# Patient Record
Sex: FEMALE | Race: WHITE | NOT HISPANIC OR LATINO
[De-identification: names, ages, dates, MRNs, and addresses within clinical notes are randomized per-mention and may not be internally consistent; named-entity substitution may affect disease eponyms.]

---

## 2017-07-13 ENCOUNTER — RESULT REVIEW (OUTPATIENT)
Age: 52
End: 2017-07-13

## 2018-01-30 ENCOUNTER — EMERGENCY (EMERGENCY)
Facility: HOSPITAL | Age: 53
LOS: 1 days | Discharge: HOME | End: 2018-01-30
Attending: EMERGENCY MEDICINE | Admitting: EMERGENCY MEDICINE
Payer: COMMERCIAL

## 2018-01-30 VITALS
HEART RATE: 74 BPM | TEMPERATURE: 98 F | SYSTOLIC BLOOD PRESSURE: 161 MMHG | DIASTOLIC BLOOD PRESSURE: 86 MMHG | OXYGEN SATURATION: 97 % | RESPIRATION RATE: 16 BRPM

## 2018-01-30 DIAGNOSIS — Z95.2 PRESENCE OF PROSTHETIC HEART VALVE: Chronic | ICD-10-CM

## 2018-01-30 DIAGNOSIS — R04.0 EPISTAXIS: ICD-10-CM

## 2018-01-30 DIAGNOSIS — Z98.890 OTHER SPECIFIED POSTPROCEDURAL STATES: Chronic | ICD-10-CM

## 2018-01-30 LAB
APTT BLD: 43.7 SEC — HIGH (ref 27.5–37.4)
BASOPHILS # BLD AUTO: 0 K/UL — SIGNIFICANT CHANGE UP (ref 0–0.2)
BASOPHILS NFR BLD AUTO: 0.8 % — SIGNIFICANT CHANGE UP (ref 0–2)
EOSINOPHIL # BLD AUTO: 0.1 K/UL — SIGNIFICANT CHANGE UP (ref 0–0.5)
EOSINOPHIL NFR BLD AUTO: 1.7 % — SIGNIFICANT CHANGE UP (ref 0–6)
HCT VFR BLD CALC: 39.2 % — SIGNIFICANT CHANGE UP (ref 34.5–45)
HGB BLD-MCNC: 14.2 G/DL — SIGNIFICANT CHANGE UP (ref 11.5–15.5)
INR BLD: 2.53 RATIO — HIGH (ref 0.88–1.16)
LYMPHOCYTES # BLD AUTO: 1.3 K/UL — SIGNIFICANT CHANGE UP (ref 1–3.3)
LYMPHOCYTES # BLD AUTO: 24.6 % — SIGNIFICANT CHANGE UP (ref 13–44)
MCHC RBC-ENTMCNC: 36.3 GM/DL — HIGH (ref 32–36)
MCHC RBC-ENTMCNC: 38.4 PG — HIGH (ref 27–34)
MCV RBC AUTO: 106 FL — HIGH (ref 80–100)
MONOCYTES # BLD AUTO: 0.4 K/UL — SIGNIFICANT CHANGE UP (ref 0–0.9)
MONOCYTES NFR BLD AUTO: 8 % — SIGNIFICANT CHANGE UP (ref 2–14)
NEUTROPHILS # BLD AUTO: 3.3 K/UL — SIGNIFICANT CHANGE UP (ref 1.8–7.4)
NEUTROPHILS NFR BLD AUTO: 64.9 % — SIGNIFICANT CHANGE UP (ref 43–77)
PLATELET # BLD AUTO: 193 K/UL — SIGNIFICANT CHANGE UP (ref 150–400)
PROTHROM AB SERPL-ACNC: 28.1 SEC — HIGH (ref 9.8–12.7)
RBC # BLD: 3.7 M/UL — LOW (ref 3.8–5.2)
RBC # FLD: 12 % — SIGNIFICANT CHANGE UP (ref 10.3–14.5)
WBC # BLD: 5.1 K/UL — SIGNIFICANT CHANGE UP (ref 3.8–10.5)
WBC # FLD AUTO: 5.1 K/UL — SIGNIFICANT CHANGE UP (ref 3.8–10.5)

## 2018-01-30 PROCEDURE — 85610 PROTHROMBIN TIME: CPT

## 2018-01-30 PROCEDURE — 85027 COMPLETE CBC AUTOMATED: CPT

## 2018-01-30 PROCEDURE — 99284 EMERGENCY DEPT VISIT MOD MDM: CPT

## 2018-01-30 PROCEDURE — 99284 EMERGENCY DEPT VISIT MOD MDM: CPT | Mod: 25

## 2018-01-30 PROCEDURE — 85730 THROMBOPLASTIN TIME PARTIAL: CPT

## 2018-01-30 PROCEDURE — 30903 CONTROL OF NOSEBLEED: CPT

## 2018-01-30 RX ORDER — ATORVASTATIN CALCIUM 80 MG/1
0 TABLET, FILM COATED ORAL
Qty: 0 | Refills: 0 | COMMUNITY

## 2018-01-30 RX ORDER — LEVOTHYROXINE SODIUM 125 MCG
0 TABLET ORAL
Qty: 0 | Refills: 0 | COMMUNITY

## 2018-01-30 RX ORDER — METOPROLOL TARTRATE 50 MG
0 TABLET ORAL
Qty: 0 | Refills: 0 | COMMUNITY

## 2018-01-30 RX ORDER — WARFARIN SODIUM 2.5 MG/1
0 TABLET ORAL
Qty: 0 | Refills: 0 | COMMUNITY

## 2018-01-30 RX ORDER — LOSARTAN POTASSIUM 100 MG/1
0 TABLET, FILM COATED ORAL
Qty: 0 | Refills: 0 | COMMUNITY

## 2018-01-30 NOTE — ED PROVIDER NOTE - PMH
Aortic valve prosthesis present  Valve replacement 1993  Connective tissue disorder    Hypercholesteremia    Hypothyroid

## 2018-01-30 NOTE — ED SUB INTERN NOTE - OBJECTIVE STATEMENT FT
Patient is a 52 year old woman on coumadin for mechanical aortic valve presenting today with 1 day of nose bleed. Patient self checked her INR at 2.7 this morning. Patient denies chest pain, shortness of breath, nausea, vomiting, abdominal pain, changes in urination/bowel movements, lightheadedness.

## 2018-01-30 NOTE — ED PROVIDER NOTE - PHYSICAL EXAMINATION
Gen: NAD, AOx3  Head: NCAT  HEENT: PERRL, oral mucosa moist, normal conjunctiva, drying blood noted around nares, no areas of active bleeding noted in nares, small blood in posterior pharynx  Lung: CTAB, no respiratory distress  CV: rrr, no murmurs, Normal perfusion  Abd: soft, NTND, no CVA tenderness  MSK: No edema, no visible deformities  Neuro: No focal neurologic deficits  Skin: No rash   Psych: normal affect

## 2018-01-30 NOTE — ED PROVIDER NOTE - OBJECTIVE STATEMENT
Patient is 52 y F with PMH mechanical AV valve on coumadin presenting with epistaxis beginning last night and recurring this morning, began in left nostril, then from both. No lightheadedness. Was not able to get apt with ENT today, has apt for tomorrow.     ENT: [facility]  ROS: Denies fever, palpitations, chills, recent sickness, HA, vision changes, cough, SOB, chest pain, abdominal pain, n/v/d/c, dysuria, hematuria, rash, new joint aches, sick contacts, and recent travel.

## 2018-01-30 NOTE — CONSULT NOTE ADULT - ASSESSMENT
50yoF 50yoF with mechanical Valve on Coumadin INR 2.5 with epistaxis x 1 ay, s/p R Rapid Rhino placement for control of bleeding

## 2018-01-30 NOTE — ED PROVIDER NOTE - MEDICAL DECISION MAKING DETAILS
epistaxis on coumadin, pt reported INR of 2.7 at home this AM, appears stable. Plan: ENT consult, h/h, INR, reassess

## 2018-01-30 NOTE — CONSULT NOTE ADULT - SUBJECTIVE AND OBJECTIVE BOX
CC: Epistaxis since yesterday    HPI: Patient is 52 y F with PMH mechanical AV valve on coumadin INR 2.7  presenting with epistaxis beginning last night and recurring this morning, began in left nostril, then from both however has been bleeding intermittently, and felt trickling and oozing down the back of her throat. Pt states she had a similar episode last year which required cauterization and packing. Actively pt denies any bleeding/hemoptysis/Fevers/chills/Dysphagia/odynophagia/hemoptysis/unintentional weight loss. Any other relevant history/symptoms.    PAST MEDICAL & SURGICAL HISTORY:  Connective tissue disorder  Hypothyroid  Hypercholesteremia  Aortic valve prosthesis present: Valve replacement 1993  History of open heart surgery  Aortic valve replaced    Allergies    No Known Allergies    Intolerances      MEDICATIONS  (STANDING):    MEDICATIONS  (PRN):    Social History: ????    ROS: ENT, GI, , CV, Pulm, Neuro, Psych, MS, Heme, Endo, Constitional; all negative except as noted in HPI    Vital Signs Last 24 Hrs  T(C): 36.9 (30 Jan 2018 13:08), Max: 36.9 (30 Jan 2018 13:08)  T(F): 98.4 (30 Jan 2018 13:08), Max: 98.4 (30 Jan 2018 13:08)  HR: 74 (30 Jan 2018 13:08) (74 - 74)  BP: 161/86 (30 Jan 2018 13:08) (161/86 - 161/86)  BP(mean): --  RR: 16 (30 Jan 2018 13:08) (16 - 16)  SpO2: 97% (30 Jan 2018 13:08) (97% - 97%)                          14.2   5.1   )-----------( 193      ( 30 Jan 2018 14:20 )             39.2         PT/INR - ( 30 Jan 2018 14:20 )   PT: 28.1 sec;   INR: 2.53 ratio         PTT - ( 30 Jan 2018 14:20 )  PTT:43.7 sec    PHYSICAL EXAM:  Gen: NAD, well-developed  Head: Normocephalic, Atraumatic  Face: no edema/erythema/fluctuance, parotid glands soft without mass  Eyes: PERRL, EOMI, no scleral injection  Nose: Right Nostril with cotton balls in place, removed with persistent bleeding bright red blood right Superior Septum Packed with Rapid Rhino inflated with 3ml of Saline with control of bleeding   Left Nare patent, with scant dry blood no sohail bleeding or discharge  Mouth: Mucosa moist, tongue/uvula midline, oropharynx clear  Neck: Flat, supple, no lymphadenopathy, trachea midline, no masses CC: Epistaxis since yesterday    HPI: Patient is 52 y F with PMH mechanical AV valve on coumadin INR 2.7  presenting with epistaxis beginning last night and recurring this morning, began in left nostril, then from both however has been bleeding intermittently, and felt trickling and oozing down the back of her throat. Pt states she had a similar episode last year which required cauterization and packing. Actively pt denies any bleeding/hemoptysis/Fevers/chills/Dysphagia/odynophagia/hemoptysis/unintentional weight loss. Any other relevant history/symptoms.    PAST MEDICAL & SURGICAL HISTORY:  Connective tissue disorder  Hypothyroid  Hypercholesteremia  Aortic valve prosthesis present: Valve replacement 1993  History of open heart surgery  Aortic valve replaced    Allergies    No Known Allergies    Intolerances      MEDICATIONS  (STANDING):    MEDICATIONS  (PRN):    Social History: ????    ROS: ENT, GI, , CV, Pulm, Neuro, Psych, MS, Heme, Endo, Constitional; all negative except as noted in HPI    Vital Signs Last 24 Hrs  T(C): 36.9 (30 Jan 2018 13:08), Max: 36.9 (30 Jan 2018 13:08)  T(F): 98.4 (30 Jan 2018 13:08), Max: 98.4 (30 Jan 2018 13:08)  HR: 74 (30 Jan 2018 13:08) (74 - 74)  BP: 161/86 (30 Jan 2018 13:08) (161/86 - 161/86)  RR: 16 (30 Jan 2018 13:08) (16 - 16)  SpO2: 97% (30 Jan 2018 13:08) (97% - 97%)                          14.2   5.1   )-----------( 193      ( 30 Jan 2018 14:20 )             39.2         PT/INR - ( 30 Jan 2018 14:20 )   PT: 28.1 sec;   INR: 2.53 ratio         PTT - ( 30 Jan 2018 14:20 )  PTT:43.7 sec    PHYSICAL EXAM:  Gen: NAD, well-developed  Head: Normocephalic, Atraumatic  Face: no edema/erythema/fluctuance, parotid glands soft without mass  Eyes: PERRL, EOMI, no scleral injection  Nose: Right Nostril with cotton balls in place, removed with persistent bleeding bright red blood right Superior Septum Packed with Rapid Rhino inflated with 3ml of Saline with control of bleeding   Left Nare patent, with scant dry blood no sohail bleeding or discharge  Mouth: Mucosa moist, tongue/uvula midline, oropharynx clear  Neck: Flat, supple, no lymphadenopathy, trachea midline, no masses

## 2018-01-30 NOTE — ED ADULT NURSE NOTE - PMH
Aortic valve prosthesis present  Valve replacement 1993 Aortic valve prosthesis present  Valve replacement 1993  Connective tissue disorder    Hypercholesteremia    Hypothyroid

## 2018-01-30 NOTE — ED ADULT NURSE NOTE - NS ED NURSE DC INFO COMPLEXITY
Straightforward: Basic instructions, no meds, no home treatment/Patient asked questions/Simple: Patient demonstrates quick and easy understanding

## 2018-01-30 NOTE — ED PROVIDER NOTE - ATTENDING CONTRIBUTION TO CARE
Private Physician Aneta Michaels PCP Darius Choi, [facility]  52y female AVR on coumadin 1993 INR this morning 2.7. Pt comes to ed complains of epistaxis since last night. spoke to pmd and referred to ed   PE WDWN Female nad except self applied nasal packing. NCAT throat small blood clot post pharynx. No stridor Neck supple chest clear anterior & posterior abd soft cv +aortic 2/6sem  Ata Ritter MD, Facep

## 2018-01-31 ENCOUNTER — EMERGENCY (EMERGENCY)
Facility: HOSPITAL | Age: 53
LOS: 1 days | Discharge: HOME | End: 2018-01-31
Attending: EMERGENCY MEDICINE | Admitting: EMERGENCY MEDICINE
Payer: COMMERCIAL

## 2018-01-31 VITALS
SYSTOLIC BLOOD PRESSURE: 142 MMHG | DIASTOLIC BLOOD PRESSURE: 88 MMHG | OXYGEN SATURATION: 99 % | TEMPERATURE: 98 F | HEIGHT: 67 IN | HEART RATE: 78 BPM | RESPIRATION RATE: 20 BRPM | WEIGHT: 160.06 LBS

## 2018-01-31 DIAGNOSIS — Z95.2 PRESENCE OF PROSTHETIC HEART VALVE: Chronic | ICD-10-CM

## 2018-01-31 DIAGNOSIS — R51 HEADACHE: ICD-10-CM

## 2018-01-31 DIAGNOSIS — Z98.890 OTHER SPECIFIED POSTPROCEDURAL STATES: Chronic | ICD-10-CM

## 2018-01-31 PROCEDURE — 99284 EMERGENCY DEPT VISIT MOD MDM: CPT

## 2018-01-31 RX ORDER — ONDANSETRON 8 MG/1
4 TABLET, FILM COATED ORAL ONCE
Qty: 0 | Refills: 0 | Status: COMPLETED | OUTPATIENT
Start: 2018-01-31 | End: 2018-01-31

## 2018-01-31 RX ORDER — TRAMADOL HYDROCHLORIDE 50 MG/1
1 TABLET ORAL
Qty: 5 | Refills: 0 | OUTPATIENT
Start: 2018-01-31 | End: 2018-02-04

## 2018-01-31 RX ORDER — TRAMADOL HYDROCHLORIDE 50 MG/1
50 TABLET ORAL ONCE
Qty: 0 | Refills: 0 | Status: DISCONTINUED | OUTPATIENT
Start: 2018-01-31 | End: 2018-01-31

## 2018-01-31 RX ORDER — ONDANSETRON 8 MG/1
1 TABLET, FILM COATED ORAL
Qty: 5 | Refills: 0 | OUTPATIENT
Start: 2018-01-31 | End: 2018-02-04

## 2018-01-31 RX ADMIN — TRAMADOL HYDROCHLORIDE 50 MILLIGRAM(S): 50 TABLET ORAL at 12:38

## 2018-01-31 RX ADMIN — ONDANSETRON 4 MILLIGRAM(S): 8 TABLET, FILM COATED ORAL at 12:38

## 2018-01-31 RX ADMIN — TRAMADOL HYDROCHLORIDE 50 MILLIGRAM(S): 50 TABLET ORAL at 14:20

## 2018-01-31 NOTE — ED PROVIDER NOTE - OBJECTIVE STATEMENT
52 year old female who had an inflatable rhinoplaced yesterday for a persistent epistaxis. Complains of sinus tenderness and pain.  Denies any fevers, chills, or persistent bleeding.  On coumadin for mechanical AV valve. 52 year old female who had an inflatable rhinoplaced yesterday for a persistent epistaxis. Complains of sinus tenderness and pain.  Denies any fevers, chills, or persistent bleeding.  On coumadin for mechanical AV valve.       Attending note. Patient was seen in fast track from #3. Agree with the above. Patient epistaxis yesterday and received nasal packing with a rhino rocket yesterday. Patient was placed on Augmentin. Patient is complaining of sharp and burning pain as well as pressure in the left cheek and face. Patient denies any fevers.

## 2018-01-31 NOTE — ED PROVIDER NOTE - MEDICAL DECISION MAKING DETAILS
Attending note-epistaxis of the right nostril with nasal packing with a rhino rocket and facial pain. ENT to evaluate. 2 cc of saline removed from the balloon. Patient had some relief after removal. Attending note-epistaxis of the right nostril with nasal packing with a rhino rocket and facial pain.  2 cc of saline removed from the balloon, which improved her pain. Seen by ENT, advised to follow up outpatient.

## 2018-01-31 NOTE — CONSULT NOTE ADULT - ASSESSMENT
53yo female c/o right facial pain/pressure and HA s/p rapid rhino placement in ER yesterday. Pt states relief of symptoms with pain meds and removing 2cc's of saline from balloon.

## 2018-01-31 NOTE — CONSULT NOTE ADULT - SUBJECTIVE AND OBJECTIVE BOX
CC: Persistent right facial pain and headache s/p rapid rhino packing placed yesterday    HPI: 53yo female with mechanical AV valve on coumadin INR 2.7  presenting with persistent right facial pain, pressure and headache since yesterday. Pt had rapid rhino placed in right nare yesterday in ER to control recurrent epistaxis. In fast track today, pt was given tramadol and zofran, and 2cc of saline was removed from balloon. Pt states relief of symptoms with pain meds. She denies fever, chills, n/v, SOB, nasal discharge or bleeding.       PAST MEDICAL & SURGICAL HISTORY:  Connective tissue disorder  Hypothyroid  Hypercholesteremia  Aortic valve prosthesis present: Valve replacement 1993  History of open heart surgery  Aortic valve replaced    Allergies    No Known Allergies    Intolerances      MEDICATIONS  (STANDING):    MEDICATIONS  (PRN):      Social History: unknown tobacco use    ROS: ENT, GI, , CV, Pulm, Neuro, Psych, MS, Heme, Endo, Constitutional; all negative except as noted in HPI    Vital Signs Last 24 Hrs  T(C): 36.8 (31 Jan 2018 10:30), Max: 36.8 (31 Jan 2018 10:30)  T(F): 98.2 (31 Jan 2018 10:30), Max: 98.2 (31 Jan 2018 10:30)  HR: 78 (31 Jan 2018 10:30) (78 - 78)  BP: 142/88 (31 Jan 2018 10:30) (142/88 - 142/88)  BP(mean): --  RR: 20 (31 Jan 2018 10:30) (20 - 20)  SpO2: 99% (31 Jan 2018 10:30) (99% - 99%)                          14.2   5.1   )-----------( 193      ( 30 Jan 2018 14:20 )             39.2         PT/INR - ( 30 Jan 2018 14:20 )   PT: 28.1 sec;   INR: 2.53 ratio         PTT - ( 30 Jan 2018 14:20 )  PTT:43.7 sec    PHYSICAL EXAM:  Gen: NAD, well-developed  Head: Normocephalic, Atraumatic  Face: no edema/erythema/fluctuance, parotid glands soft without mass  Eyes: PERRL, EOMI, no scleral injection  Nose: rapid rhino in place in right nare, no bleeding or discharge noted, left nare patent  Mouth: No Stridor / Drooling / Trismus.  Mucosa moist, tongue/uvula midline, oropharynx clear  Neck: Flat, supple, no lymphadenopathy, trachea midline, no masses  Resp: breathing easily, no stridor  CV: no peripheral edema/cyanosis

## 2018-01-31 NOTE — ED PROVIDER NOTE - PHYSICAL EXAMINATION
Attending note. Patient is alert and in no acute distress. Pupils are 3 mm equal reactive. There is no some conjunctival edema or hemorrhage. There is nasal packing in the right naris. There is no leaking of fluid or blood anterior nostril. Oropharynx is normal. There is no tenderness over the right maxilla. There is no facial swelling.

## 2018-01-31 NOTE — ED PROVIDER NOTE - ENMT, MLM
Airway patent, Nasal mucosa clear. Mouth with normal mucosa.  Inflatable rhino rocket in place with no gross bleeding.

## 2018-03-28 NOTE — ED ADULT TRIAGE NOTE - ARRIVAL FROM
Home Please contact your provider for any pain uncontrolled by medication, excessive bleeding or Fever>100.4  Please take aleve-1 tablet every 12 hours x 3 days, may take percocet as prescribed for breakthrough pain.  SCRIPTS SENT TO [facility] AT [facility].

## 2021-08-27 NOTE — ED PROVIDER NOTE - ATTESTATION, MLM
Problem: Increased nutrient needs (NI-5.1)  Goal: Food and/or Nutrient Delivery  Description: Individualized approach for food/nutrient provision.   Outcome: Met This Shift I have reviewed and confirmed nurses' notes for patient's medications, allergies, medical history, and surgical history.

## 2022-04-19 NOTE — ED SUB INTERN NOTE - CPE EDP ENMT NORM
PATIENT NAME:  Ana Bunn  YOB: 1961  MRN: [id number]  SURGEON: Dr. Hartley  DATE of CONSULT: 04/19/2022  Consult for SDH, cervical spondylosis, lumbar radiculopathy    FOLLOW-UP PLAN:    Hospital Follow Up Visit: 2 weeks  Provider: [facility] on Dr. Hartley clinic day    DIAGNOSTICS: C/L MRI, head CT, XR  DISPOSITION: Home same day    ADDITIONAL INSTRUCTIONS FOR MEDICAL STAFF:    Amber Grewal RN, CNRN       - - -

## 2023-02-15 PROBLEM — Z95.2 PRESENCE OF PROSTHETIC HEART VALVE: Chronic | Status: ACTIVE | Noted: 2018-01-30

## 2023-02-15 PROBLEM — E03.9 HYPOTHYROIDISM, UNSPECIFIED: Chronic | Status: ACTIVE | Noted: 2018-01-30

## 2023-02-15 PROBLEM — M35.9 SYSTEMIC INVOLVEMENT OF CONNECTIVE TISSUE, UNSPECIFIED: Chronic | Status: ACTIVE | Noted: 2018-01-30

## 2023-02-15 PROBLEM — E78.00 PURE HYPERCHOLESTEROLEMIA, UNSPECIFIED: Chronic | Status: ACTIVE | Noted: 2018-01-30

## 2023-07-26 ENCOUNTER — APPOINTMENT (OUTPATIENT)
Dept: INTERNAL MEDICINE | Facility: CLINIC | Age: 58
End: 2023-07-26
Payer: COMMERCIAL

## 2023-07-26 VITALS
WEIGHT: 167 LBS | SYSTOLIC BLOOD PRESSURE: 142 MMHG | TEMPERATURE: 98.3 F | HEIGHT: 66.5 IN | BODY MASS INDEX: 26.52 KG/M2 | HEART RATE: 68 BPM | DIASTOLIC BLOOD PRESSURE: 80 MMHG | OXYGEN SATURATION: 96 %

## 2023-07-26 VITALS — DIASTOLIC BLOOD PRESSURE: 68 MMHG | SYSTOLIC BLOOD PRESSURE: 122 MMHG

## 2023-07-26 VITALS — DIASTOLIC BLOOD PRESSURE: 68 MMHG | SYSTOLIC BLOOD PRESSURE: 112 MMHG

## 2023-07-26 DIAGNOSIS — H40.9 UNSPECIFIED GLAUCOMA: ICD-10-CM

## 2023-07-26 DIAGNOSIS — I71.9 AORTIC ANEURYSM OF UNSPECIFIED SITE, W/OUT RUPTURE: ICD-10-CM

## 2023-07-26 DIAGNOSIS — Z79.01 LONG TERM (CURRENT) USE OF ANTICOAGULANTS: ICD-10-CM

## 2023-07-26 DIAGNOSIS — Z82.49 FAMILY HISTORY OF ISCHEMIC HEART DISEASE AND OTHER DISEASES OF THE CIRCULATORY SYSTEM: ICD-10-CM

## 2023-07-26 DIAGNOSIS — R94.6 ABNORMAL RESULTS OF THYROID FUNCTION STUDIES: ICD-10-CM

## 2023-07-26 DIAGNOSIS — Z87.891 PERSONAL HISTORY OF NICOTINE DEPENDENCE: ICD-10-CM

## 2023-07-26 DIAGNOSIS — E03.9 HYPOTHYROIDISM, UNSPECIFIED: ICD-10-CM

## 2023-07-26 DIAGNOSIS — Z80.0 FAMILY HISTORY OF MALIGNANT NEOPLASM OF DIGESTIVE ORGANS: ICD-10-CM

## 2023-07-26 DIAGNOSIS — Z00.00 ENCOUNTER FOR GENERAL ADULT MEDICAL EXAMINATION W/OUT ABNORMAL FINDINGS: ICD-10-CM

## 2023-07-26 DIAGNOSIS — K57.32 DIVERTICULITIS OF LARGE INTESTINE W/OUT PERFORATION OR ABSCESS W/OUT BLEEDING: ICD-10-CM

## 2023-07-26 DIAGNOSIS — I10 ESSENTIAL (PRIMARY) HYPERTENSION: ICD-10-CM

## 2023-07-26 DIAGNOSIS — E78.5 HYPERLIPIDEMIA, UNSPECIFIED: ICD-10-CM

## 2023-07-26 DIAGNOSIS — K63.5 POLYP OF COLON: ICD-10-CM

## 2023-07-26 DIAGNOSIS — Z92.89 PERSONAL HISTORY OF OTHER MEDICAL TREATMENT: ICD-10-CM

## 2023-07-26 PROCEDURE — 99386 PREV VISIT NEW AGE 40-64: CPT

## 2023-07-26 RX ORDER — WARFARIN SODIUM 7.5 MG/1
7.5 TABLET ORAL
Refills: 0 | Status: ACTIVE | COMMUNITY

## 2023-07-26 RX ORDER — LOSARTAN POTASSIUM 50 MG/1
50 TABLET, FILM COATED ORAL TWICE DAILY
Qty: 1 | Refills: 0 | Status: ACTIVE | COMMUNITY

## 2023-07-26 RX ORDER — WARFARIN SODIUM 5 MG/1
5 TABLET ORAL
Refills: 0 | Status: ACTIVE | COMMUNITY

## 2023-07-26 RX ORDER — ATORVASTATIN CALCIUM 10 MG/1
10 TABLET, FILM COATED ORAL
Refills: 0 | Status: ACTIVE | COMMUNITY

## 2023-07-26 RX ORDER — LEVOTHYROXINE SODIUM 100 UG/1
100 TABLET ORAL
Refills: 0 | Status: ACTIVE | COMMUNITY

## 2023-07-26 RX ORDER — METOPROLOL TARTRATE 25 MG/1
25 TABLET, FILM COATED ORAL TWICE DAILY
Refills: 0 | Status: ACTIVE | COMMUNITY

## 2023-07-26 NOTE — HEALTH RISK ASSESSMENT
[Yes] : Yes [2 - 4 times a month (2 pts)] : 2-4 times a month (2 points) [1 or 2 (0 pts)] : 1 or 2 (0 points) [Never (0 pts)] : Never (0 points) [No] : In the past 12 months have you used drugs other than those required for medical reasons? No [0] : 2) Feeling down, depressed, or hopeless: Not at all (0) [Patient reported mammogram was normal] : Patient reported mammogram was normal [Patient reported PAP Smear was normal] : Patient reported PAP Smear was normal [Patient reported bone density results were normal] : Patient reported bone density results were normal [Patient reported colonoscopy was normal] : Patient reported colonoscopy was normal [No falls in past year] : Patient reported no falls in the past year [PHQ-2 Negative - No further assessment needed] : PHQ-2 Negative - No further assessment needed [None] : None [With Family] : lives with family [] :  [Fully functional (bathing, dressing, toileting, transferring, walking, feeding)] : Fully functional (bathing, dressing, toileting, transferring, walking, feeding) [Fully functional (using the telephone, shopping, preparing meals, housekeeping, doing laundry, using] : Fully functional and needs no help or supervision to perform IADLs (using the telephone, shopping, preparing meals, housekeeping, doing laundry, using transportation, managing medications and managing finances) [Smoke Detector] : smoke detector [Carbon Monoxide Detector] : carbon monoxide detector [Seat Belt] :  uses seat belt [Sunscreen] : uses sunscreen [With Patient/Caregiver] : , with patient/caregiver [Former] : Former [5-9] : 5-9 [> 15 Years] : > 15 Years [de-identified] : lon [de-identified] : reg [[id number]] : 0 [EyeExamDate] : 5/1/23 [MammogramDate] : 4/1/23 [PapSmearDate] : 11/1/22 [BoneDensityDate] : 10/1/22 [ColonoscopyDate] : 12/1/19 [AdvancecareDate] : 7/23

## 2023-10-23 NOTE — ED PROVIDER NOTE - PROGRESS NOTE DETAILS
How Severe Is Your Acne?: moderate
Is This A New Presentation, Or A Follow-Up?: Acne
Pt packed by ENT/PA adivised to wait for ENT attending. Declined same, Pt competent to refuse. WIll follow up with Private Physician?ENT  Ata Ritter MD, Facep

## 2024-04-04 ENCOUNTER — APPOINTMENT (OUTPATIENT)
Dept: RADIOLOGY | Facility: CLINIC | Age: 59
End: 2024-04-04